# Patient Record
Sex: FEMALE | Race: BLACK OR AFRICAN AMERICAN
[De-identification: names, ages, dates, MRNs, and addresses within clinical notes are randomized per-mention and may not be internally consistent; named-entity substitution may affect disease eponyms.]

---

## 2017-01-04 ENCOUNTER — HOSPITAL ENCOUNTER (EMERGENCY)
Dept: HOSPITAL 62 - ER | Age: 37
LOS: 1 days | Discharge: HOME | End: 2017-01-05
Payer: SELF-PAY

## 2017-01-04 DIAGNOSIS — E78.00: ICD-10-CM

## 2017-01-04 DIAGNOSIS — R51: ICD-10-CM

## 2017-01-04 DIAGNOSIS — J06.9: Primary | ICD-10-CM

## 2017-01-04 DIAGNOSIS — R50.9: ICD-10-CM

## 2017-01-04 DIAGNOSIS — M79.1: ICD-10-CM

## 2017-01-04 PROCEDURE — 99283 EMERGENCY DEPT VISIT LOW MDM: CPT

## 2017-01-04 PROCEDURE — 87804 INFLUENZA ASSAY W/OPTIC: CPT

## 2017-01-04 PROCEDURE — 87070 CULTURE OTHR SPECIMN AEROBIC: CPT

## 2017-01-04 PROCEDURE — 87880 STREP A ASSAY W/OPTIC: CPT

## 2017-01-04 NOTE — ER DOCUMENT REPORT
ED Flu Like





- General


Chief Complaint: Flu Symptoms


Stated Complaint: BODY ACHES/FEVER


Mode of Arrival: Ambulatory


Information source: Patient


Notes: 


Patient is a 36-year-old female who presents to the ER today for 1 day of 

headache, body aches, runny nose and feeling like she had a fever.  Patient did 

not take her temperature.  She denies any sore throat but states she's had a 

mild cough.  She denies any history of asthma or COPD.


TRAVEL OUTSIDE OF THE [place] IN LAST 30 DAYS: No





- Related Data


Allergies/Adverse Reactions: 


 





No Known Allergies Allergy (Verified 03/26/12 13:22)


 











Past Medical History





- General


Information source: Patient





- Social History


Smoking Status: Unknown if Ever Smoked


Family History: CAD, DM, Hyperlipidemia, Hypertension, Malignancy.  denies: 

Arthritis, COPD, CVA, Thyroid Disfunction





- Past Medical History


Cardiac Medical History: Reports: Hx Hypercholesterolemia


Pulmonary Medical History: Reports: Hx Asthma - childhood


GI Medical History: Reports: Hx Colonoscopy


Past Surgical History: Reports: Hx Oral Surgery





- Immunizations


Immunizations up to date: Yes


Hx Diphtheria, Pertussis, Tetanus Vaccination: No - unknown


Hx Pneumococcal Vaccination: 10/01/11





Review of Systems





- Review of Systems


Constitutional: See HPI


EENT: See HPI


Cardiovascular: No symptoms reported


Respiratory: See HPI


Gastrointestinal: No symptoms reported


Genitourinary: No symptoms reported


Female Genitourinary: No symptoms reported


Musculoskeletal: No symptoms reported


Skin: No symptoms reported


Hematologic/Lymphatic: No symptoms reported


Neurological/Psychological: No symptoms reported





Physical Exam





- Vital signs


Vitals: 


 











Temp Pulse Resp BP Pulse Ox


 


 98.5 F   91   20   123/90 H  99 


 


 01/04/17 20:51  01/04/17 20:51  01/04/17 20:51  01/04/17 20:51  01/04/17 20:51














- Notes


Notes: 


PHYSICAL EXAMINATION: 


GENERAL: Mildly ill appearing, and in no acute distress. 


HEAD: Atraumatic, normocephalic. 


EYES: Pupils equal round and reactive to light, extraocular movements intact, 

sclera anicteric, conjunctiva are normal. 


ENT: ear canals without erythema or foreign body, TMs pearly grey with good 

bony landmarks, nares with mucoid discharge, oropharynx clear without exudates. 

Moist mucous membranes. 


NECK: Normal range of motion, supple without lymphadenopathy 


LUNGS: CTAB and equal. No wheezes rales or rhonchi. 


HEART: Regular rate and rhythm without murmurs


EXTREMITIES: Normal range of motion, no pitting edema. No cyanosis. 


NEUROLOGICAL: Cranial nerves grossly intact. Normal sensory/motor exams. 


PSYCH: Normal mood, normal affect. 


SKIN: Warm, Dry, normal turgor, no rashes or lesions noted 

















Course





- Re-evaluation


Re-evalutation: 





01/05/17 01:09


Flu and strep were both negative.





- Vital Signs


Vital signs: 


 











Temp Pulse Resp BP Pulse Ox


 


 98.5 F   91   20   123/90 H  99 


 


 01/04/17 20:51  01/04/17 20:51  01/04/17 20:51  01/04/17 20:51  01/04/17 20:51














Discharge





- Discharge


Clinical Impression: 


URI (upper respiratory infection)


Qualifiers:


 URI type: unspecified URI Qualified Code(s): J06.9 - Acute upper respiratory 

infection, unspecified





Condition: Stable


Disposition: HOME, SELF-CARE


Instructions:  Upper Respiratory Illness ([facility])


Additional Instructions: 


Please drink plenty of fluids and rest.  Return immediately for any new or 

worsening symptoms.





Follow up with primary care provider, call tomorrow to make followup 

appointment.


Prescriptions: 


Guaifenesin/Dextromethorphan [Mucinex Dm Er 1,200-60 Mg Tab] 1 each PO BID PRN #

24 tbmp.12hr


 PRN Reason: 


Ibuprofen [Motrin 800 mg Tablet] 800 mg PO Q8H PRN #30 tab


 PRN Reason: 


Forms:  Return to Work

## 2017-01-05 VITALS — SYSTOLIC BLOOD PRESSURE: 122 MMHG | DIASTOLIC BLOOD PRESSURE: 89 MMHG

## 2017-06-02 ENCOUNTER — HOSPITAL ENCOUNTER (EMERGENCY)
Dept: HOSPITAL 62 - ER | Age: 37
Discharge: HOME | End: 2017-06-02
Payer: SELF-PAY

## 2017-06-02 VITALS — SYSTOLIC BLOOD PRESSURE: 134 MMHG | DIASTOLIC BLOOD PRESSURE: 81 MMHG

## 2017-06-02 DIAGNOSIS — S16.1XXA: Primary | ICD-10-CM

## 2017-06-02 DIAGNOSIS — M54.2: ICD-10-CM
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
## 2017-06-02 PROCEDURE — 99283 EMERGENCY DEPT VISIT LOW MDM: CPT

## 2018-01-11 ENCOUNTER — HOSPITAL ENCOUNTER (EMERGENCY)
Age: 38
Discharge: HOME | End: 2018-01-11
Attending: EMERGENCY MEDICINE
Payer: SELF-PAY

## 2018-01-11 VITALS
HEART RATE: 100 BPM | OXYGEN SATURATION: 100 % | TEMPERATURE: 98.1 F | SYSTOLIC BLOOD PRESSURE: 130 MMHG | RESPIRATION RATE: 16 BRPM | HEIGHT: 65 IN | BODY MASS INDEX: 39.32 KG/M2 | WEIGHT: 236 LBS | DIASTOLIC BLOOD PRESSURE: 88 MMHG

## 2018-01-11 DIAGNOSIS — Z20.828 EXPOSURE TO THE FLU: ICD-10-CM

## 2018-01-11 DIAGNOSIS — J06.9 ACUTE UPPER RESPIRATORY INFECTION: Primary | ICD-10-CM

## 2018-01-11 DIAGNOSIS — R68.89 FLU-LIKE SYMPTOMS: ICD-10-CM

## 2018-01-11 PROCEDURE — 99282 EMERGENCY DEPT VISIT SF MDM: CPT

## 2018-01-11 NOTE — LETTER
[facility] EMERGENCY DEPT 
[facility] [address] [id number] 
[phone] Work/School Note Date: 1/11/2018 To Whom It May concern: 
 
Savanna Milton was seen and treated today in the emergency room by the following provider(s): 
Attending Provider: Abraham Chapman MD. Rambo Marc may return to work on 1/13/18. Sincerely, Abraham Chapman MD

## 2018-01-11 NOTE — DISCHARGE INSTRUCTIONS
Upper Respiratory Infection (Cold): Care Instructions  Your Care Instructions    An upper respiratory infection, or URI, is an infection of the nose, sinuses, or throat. URIs are spread by coughs, sneezes, and direct contact. The common cold is the most frequent kind of URI. The flu and sinus infections are other kinds of URIs. Almost all URIs are caused by viruses. Antibiotics won't cure them. But you can treat most infections with home care. This may include drinking lots of fluids and taking over-the-counter pain medicine. You will probably feel better in 4 to 10 days. The doctor has checked you carefully, but problems can develop later. If you notice any problems or new symptoms, get medical treatment right away. Follow-up care is a key part of your treatment and safety. Be sure to make and go to all appointments, and call your doctor if you are having problems. It's also a good idea to know your test results and keep a list of the medicines you take. How can you care for yourself at home? · To prevent dehydration, drink plenty of fluids, enough so that your urine is light yellow or clear like water. Choose water and other caffeine-free clear liquids until you feel better. If you have kidney, heart, or liver disease and have to limit fluids, talk with your doctor before you increase the amount of fluids you drink. · Take an over-the-counter pain medicine, such as acetaminophen (Tylenol), ibuprofen (Advil, Motrin), or naproxen (Aleve). Read and follow all instructions on the label. · Before you use cough and cold medicines, check the label. These medicines may not be safe for young children or for people with certain health problems. · Be careful when taking over-the-counter cold or flu medicines and Tylenol at the same time. Many of these medicines have acetaminophen, which is Tylenol. Read the labels to make sure that you are not taking more than the recommended dose.  Too much acetaminophen (Tylenol) can be harmful. · Get plenty of rest.  · Do not smoke or allow others to smoke around you. If you need help quitting, talk to your doctor about stop-smoking programs and medicines. These can increase your chances of quitting for good. When should you call for help? Call 911 anytime you think you may need emergency care. For example, call if:  ? · You have severe trouble breathing. ?Call your doctor now or seek immediate medical care if:  ? · You seem to be getting much sicker. ? · You have new or worse trouble breathing. ? · You have a new or higher fever. ? · You have a new rash. ? Watch closely for changes in your health, and be sure to contact your doctor if:  ? · You have a new symptom, such as a sore throat, an earache, or sinus pain. ? · You cough more deeply or more often, especially if you notice more mucus or a change in the color of your mucus. ? · You do not get better as expected. Where can you learn more? Go to http://nano-misty.info/. Enter M980 in the search box to learn more about \"Upper Respiratory Infection (Cold): Care Instructions. \"  Current as of: May 12, 2017  Content Version: 11.4  © 6099-7396 [employer]. Care instructions adapted under license by [employer] (which disclaims liability or warranty for this information). If you have questions about a medical condition or this instruction, always ask your healthcare professional. [place] [address] any warranty or liability for your use of this information.

## 2018-01-11 NOTE — ED PROVIDER NOTES
EMERGENCY DEPARTMENT HISTORY AND PHYSICAL EXAM    3:07 PM      Date: 1/11/2018  Patient Name: Deann Hua    History of Presenting Illness     No chief complaint on file. History Provided By: Patient    Chief Complaint: Generalized body aches  Duration:  3 days  Timing:  Worsening  Location: Patient also c/o Headache  Quality: Soreness  Severity: 6 out of 10  Modifying Factors: Patient states she has been taking Theraflu and Benadryl with no relief in her symptoms. States that it only makes her feel drowsy. Associated Symptoms: Associated symptoms include weakness, a fever 3 days ago, with the highest being 99.3, nasal congestion, HA, rhinorrhea, and intermittent diarrhea that started this morning. Additional History (Context): April Anton Carmita is a 40 y.o. female with No significant past medical history who presents with generalized myalgia onset 3 days. Patient states that she went to work 3 days ago, was off 2 days ago, went to work 1 day ago, and states that her symptoms worsened today. Patient states that she was exposed to the flu from living with her friend's daughter who had a positive flu over the past weekend. Patient states she has been taking Theraflu and Benadryl with no relief in her symptoms. States that it only makes her feel drowsy. Associated symptoms include weakness, a fever 3 days ago, with the highest being 99.3, nasal congestion, HA, rhinorrhea, and intermittent diarrhea that started this morning. LNMP was 12/13/2017. Patient expresses no other concerns at this time. PCP: None        Past History     Past Medical History:  History reviewed. No pertinent past medical history. Past Surgical History:  History reviewed. No pertinent surgical history. Family History:  History reviewed. No pertinent family history.     Social History:  Social History   Substance Use Topics    Smoking status: Never Smoker    Smokeless tobacco: Never Used    Alcohol use None       Allergies:  No Known Allergies      Review of Systems     Review of Systems   Constitutional: Positive for fever (fever of 99.3 3 days ago, but has improved). Negative for chills. HENT: Positive for congestion (nasal) and rhinorrhea. Negative for sore throat. Respiratory: Negative for cough and shortness of breath. Cardiovascular: Negative for chest pain. Gastrointestinal: Positive for diarrhea. Negative for abdominal pain, nausea and vomiting. Genitourinary: Negative for dysuria. Musculoskeletal: Positive for myalgias (generalized). Negative for back pain. Skin: Negative for rash. Neurological: Positive for weakness and headaches. Negative for dizziness. All other systems reviewed and are negative. Physical Exam     Visit Vitals    /88 (BP 1 Location: Right arm, BP Patient Position: At rest)    Pulse 100    Temp 98.1 °F (36.7 °C)    Resp 16    Ht 5' 5\" (1.651 m)    Wt 107 kg (236 lb)    LMP 12/13/2017    SpO2 100%    BMI 39.27 kg/m2     Physical Exam  General Exam: Patient is a well developed and well nourished in no distress. Patient does not appear acutely ill or toxic. Eye Exam: Lids and conjunctiva are normal  ENT Exam: The general head and facial exam is normal.  The neck is supple without meningeal signs. No significant adenopathy. Pulmonary Exam: No respiratory distress. The respiratory rate is normal.  No stridor. The breath sounds are equal bilaterally. There are no wheezes, rales, or rhonchi noted. Cardiac Exam: The cardiac rate and rhythm are normal.  No significant murmurs, rubs, or gallops. The peripheral pulses are normal.  Abdominal Exam: Abdomen is soft and non-distended. No pulsatile masses. There is no local tenderness. There is no rebound or guarding noted. Skin and Soft Tissue: The skin is warm and dry, without significant abnormality. Good color. Musculoskeletal Exam: No peripheral edema.   The musculoskeletal exam of the lower extremities is normal without significant local tenderness. Psychiatric: Normal adult with appropriate demeanor and interpersonal interaction. Is oriented to person, place, and time. Diagnostic Study Results     Labs -  No results found for this or any previous visit (from the past 12 hour(s)). Radiologic Studies -   No orders to display         Medical Decision Making   I am the first provider for this patient. I reviewed the vital signs, available nursing notes, past medical history, past surgical history, family history and social history. Vital Signs-Reviewed the patient's vital signs. Records Reviewed: Nursing Notes and Triage notes (Time of Review: 3:07 PM)    ED Course: Progress Notes, Reevaluation, and Consults:    Provider Notes (Medical Decision Making): Flu like illness with known exposure. Out of window for Tamiflu and pt is not immunocompromised. Discussed diagnosis, plan for supportive care, and need to return with any worsening symptoms. Vitals reassuring, no indication for labs or imaging at this time. Diagnosis     Clinical Impression:   1. Acute upper respiratory infection    2. Flu-like symptoms    3. Exposure to the flu        Disposition: Discharged     Follow-up Information     Follow up With Details Comments Contact Info    [facility] EMERGENCY DEPT   [address] [place] [address]  [phone]           Patient's Medications    No medications on file     _______________________________    Attestations:  [id number] [id number] acting as a scribe for and in the presence of Barber Lozano MD      January 11, 2018 at 3:10 PM       Provider Attestation:      I personally performed the services described in the documentation, reviewed the documentation, as recorded by the scribe in my presence, and it accurately and completely records my words and actions.  January 11, 2018 at 3:10 PM - Barber Lozano MD    _______________________________

## 2020-03-15 ENCOUNTER — HOSPITAL ENCOUNTER (EMERGENCY)
Dept: HOSPITAL 62 - ER | Age: 40
Discharge: HOME | End: 2020-03-15
Payer: SELF-PAY

## 2020-03-15 VITALS — SYSTOLIC BLOOD PRESSURE: 135 MMHG | DIASTOLIC BLOOD PRESSURE: 88 MMHG

## 2020-03-15 DIAGNOSIS — E78.00: ICD-10-CM

## 2020-03-15 DIAGNOSIS — R09.81: ICD-10-CM

## 2020-03-15 DIAGNOSIS — J30.9: Primary | ICD-10-CM

## 2020-03-15 PROCEDURE — 99283 EMERGENCY DEPT VISIT LOW MDM: CPT

## 2020-03-15 PROCEDURE — 96372 THER/PROPH/DIAG INJ SC/IM: CPT

## 2020-03-15 NOTE — ER DOCUMENT REPORT
HPI





- HPI


Time Seen by Provider: 03/15/20 10:09


Onset: Other - This is a 39-year-old female presented emergency room today 

stating that she has had nasal discharge for approximately 2 days she works in a

[employer] center and she is concerned about getting her but he also affected her 

employer wanted a work note before she could return.


Quality of pain: No pain


Pain Level: 0


Associated Symptoms: None


Exacerbated by: Denies





- CONSTITUTIONAL


Constitutional: DENIES: Fever, Chills





- REPRODUCTIVE


Reproductive: DENIES: Pregnant:





Past Medical History





- General


Information source: Patient





- Social History


Smoking Status: Never Smoker


Chew tobacco use (# tins/day): No


Frequency of alcohol use: None


Drug Abuse: None


Family History: DM, Hyperlipidemia, Hypertension, Malignancy.  denies: 

Arthritis, CAD, COPD, CVA, Thyroid Disfunction


Patient has suicidal ideation: No


Patient has homicidal ideation: No





- Past Medical History


Cardiac Medical History: Reports: Hx Hypercholesterolemia


Pulmonary Medical History: Reports: Hx Asthma - childhood


Renal/ Medical History: Denies: Hx Peritoneal Dialysis


GI Medical History: Reports: Hx Colonoscopy


Past Surgical History: Reports: Hx Oral Surgery





- Immunizations


Immunizations up to date: Yes


Hx Diphtheria, Pertussis, Tetanus Vaccination: No - unknown


Hx Pneumococcal Vaccination: 10/01/11





Vertical Provider Document





- CONSTITUTIONAL


Agree With Documented VS: Yes





- INFECTION CONTROL


TRAVEL OUTSIDE OF THE [place] IN LAST 30 DAYS: No





- HEENT


HEENT: Atraumatic





- NECK


Neck: Normal Inspection





- RESPIRATORY


Respiratory: Breath Sounds Normal





- CARDIOVASCULAR


Cardiovascular: Regular Rate, Regular Rhythm


Pulses: Normal: Brachial, Radial, Carotid, Femoral, Popliteal





- GI/ABDOMEN


Gastrointestinal: Abdomen Soft, Abdomen Non-Tender





- REPRODUCTIVE


Female Genitalia: Normal Inspection





- BACK


Back: Normal Inspection





Course





- Vital Signs


Vital signs: 


                                        











Temp Pulse Resp BP Pulse Ox


 


 97.5 F   97   16   135/88 H  97 


 


 03/15/20 10:14  03/15/20 10:14  03/15/20 10:14  03/15/20 10:14  03/15/20 10:14














Discharge





- Discharge


Clinical Impression: 


Allergic rhinitis


Qualifiers:


 Allergic rhinitis trigger: unspecified Allergic rhinitis seasonality: 

unspecified Qualified Code(s): J30.9 - Allergic rhinitis, unspecified





Disposition: HOME, SELF-CARE


Instructions:  Non-Sedating Prescription Antihistamine ([facility])


Prescriptions: 


Loratadine 10 mg PO QAM #30 tab.[email or web address]

## 2020-12-30 ENCOUNTER — HOSPITAL ENCOUNTER (EMERGENCY)
Dept: HOSPITAL 62 - ER | Age: 40
Discharge: HOME | End: 2020-12-30
Payer: SELF-PAY

## 2020-12-30 VITALS — SYSTOLIC BLOOD PRESSURE: 136 MMHG | DIASTOLIC BLOOD PRESSURE: 88 MMHG

## 2020-12-30 DIAGNOSIS — M54.5: Primary | ICD-10-CM

## 2020-12-30 DIAGNOSIS — R30.0: ICD-10-CM

## 2020-12-30 LAB
APPEARANCE UR: (no result)
APTT PPP: (no result) S
BILIRUB UR QL STRIP: NEGATIVE
GLUCOSE UR STRIP-MCNC: NEGATIVE MG/DL
KETONES UR STRIP-MCNC: NEGATIVE MG/DL
NITRITE UR QL STRIP: NEGATIVE
PH UR STRIP: 5 [PH] (ref 5–9)
PROT UR STRIP-MCNC: 100 MG/DL
SP GR UR STRIP: 1.04
UROBILINOGEN UR-MCNC: 2 MG/DL (ref ?–2)

## 2020-12-30 PROCEDURE — 81025 URINE PREGNANCY TEST: CPT

## 2020-12-30 PROCEDURE — 99283 EMERGENCY DEPT VISIT LOW MDM: CPT

## 2020-12-30 PROCEDURE — 81001 URINALYSIS AUTO W/SCOPE: CPT

## 2020-12-30 NOTE — ER DOCUMENT REPORT
HPI





- HPI


Patient complains to provider of: Back pain


Time Seen by Provider: 12/30/20 14:25


Onset/Duration: Gradual


Quality of pain: Achy


Context: 





Patient presents complaining of lower back pain for the past 2 days.  Patient 

states dysuria started yesterday.  Patient denies any traumatic injury.  Patient

denies any fever, nausea or vomiting.


Associated Symptoms: denies: Fever, Nausea, Vomiting


Exacerbated by: Movement


Relieved by: Denies


Similar symptoms previously: No


Recently seen / treated by doctor: No





- ROS


ROS below otherwise negative: Yes


Systems Reviewed and Negative: Yes All other systems reviewed and negative





- CONSTITUTIONAL


Constitutional: DENIES: Fever, Chills





- NEURO


Neurology: DENIES: Headache, Weakness





- GASTROINTESTINAL


Gastrointestinal: DENIES: Abdominal Pain, Nausea, Patient vomiting





- URINARY


Urinary: REPORTS: Dysuria.  DENIES: Urgency





- REPRODUCTIVE


Reproductive: DENIES: Pregnant:





- MUSCULOSKELETAL


Musculoskeletal: REPORTS: Back Pain.  DENIES: Extremity pain





- DERM


Skin Color: Normal


Skin Problems: None





Past Medical History





- General


Information source: Patient





- Social History


Smoking Status: Never Smoker


Frequency of alcohol use: Occasional


Drug Abuse: None


Occupation: Works from home


Family History: DM, Hyperlipidemia, Hypertension, Malignancy.  denies: 

Arthritis, CAD, COPD, CVA, Thyroid Disfunction





- Past Medical History


Cardiac Medical History: Reports: Hx Hypercholesterolemia


Pulmonary Medical History: Reports: Hx Asthma - childhood


Renal/ Medical History: Denies: Hx Peritoneal Dialysis


GI Medical History: Reports: Hx Colonoscopy


Past Surgical History: Reports: Hx Oral Surgery





- Immunizations


Immunizations up to date: Yes


Hx Diphtheria, Pertussis, Tetanus Vaccination: No - unknown


Hx Pneumococcal Vaccination: 10/01/11





Vertical Provider Document





- CONSTITUTIONAL


Agree With Documented VS: Yes


Exam Limitations: No Limitations


General Appearance: WD/WN, No Apparent Distress


Notes: 





PHYSICAL EXAMINATION:





GENERAL: Well-appearing, well-nourished and in no acute distress.





HEAD: Atraumatic, normocephalic.





EYES: sclera clear, anicteric, conjunctiva are normal.





ENT: nares patent, Moist mucous membranes.


no lymphadenopathy





LUNGS: respirations unlabored





HEART: Regular rate and rhythm without murmurs 





EXTREMITIES: Normal range of motion, no pitting or edema.  No cyanosis. Gait 

normal, pt ambulates without difficulty





BACK: No midline tenderness, no deformities or step-offs.  Bilateral CVA 

tenderness. 





NEUROLOGICAL: Cranial nerves grossly intact.  Normal speech, normal gait.   





PSYCH: Normal mood, normal affect.





SKIN: Warm, Dry, normal turgor, no rashes or lesions noted.











- INFECTION CONTROL


TRAVEL OUTSIDE OF THE [place] IN LAST 30 DAYS: No





Course





- Re-evaluation


Re-evalutation: 





12/30/20 16:01


Spoke with technician in lab who states that analyzer is down and that they are 

having to run urinalysis manually.


12/30/20 17:01


Patient still without any urinalysis results at this time.  Will treat patient 

symptomatically as patient is nontoxic in appearance, no concern for sepsis at 

this time.  No concern for any obstructive uropathy as patient's pain symptoms 

are bilateral.  Patient is agreeable with this discharge plan of care at this 

time.





- Laboratory Results


Critical Laboratory Results Reviewed: No Critical Results





- Radiology Results


Critical Radiology Results Reviewed: No Critical Results





Discharge





- Discharge


Clinical Impression: 


 Urinary symptom or sign





Low back pain


Qualifiers:


 Chronicity: unspecified Back pain laterality: bilateral Sciatica presence: 

without sciatica Qualified Code(s): M54.5 - Low back pain





Condition: Stable


Disposition: HOME, SELF-CARE


Instructions:  Cephalexin ([facility]), Low Back Pain ([facility]), Muscle Relaxers ([facility]), 

Warm Packs ([facility])


Additional Instructions: 


Return immediately for any new or worsening symptoms





Followup with your primary care provider, call tomorrow to make a followup 

appointment








Prescriptions: 


Cephalexin Monohydrate [Keflex 500 mg Capsule] 500 mg PO Q6H 5 Days #20 capsule


Lidocaine [Lidoderm 5% (700 mg) Transdermal Patch] 1 patch TP DAILY PRN #10 

adh..patch


 PRN Reason: 


Naproxen [Naprosyn 250 Nmg Tablet] 1 tab PO BID #14 tablet


Methocarbamol [Robaxin 500 Mg Tablet] 500 mg PO QID PRN #30 tablet


 PRN Reason: 


Forms:  Return to Work


[facility] 


[facility] - Follow up as needed

## 2021-01-06 ENCOUNTER — HOSPITAL ENCOUNTER (EMERGENCY)
Dept: HOSPITAL 62 - ER | Age: 41
Discharge: LEFT BEFORE DISCHARGE | End: 2021-01-06
Payer: SELF-PAY

## 2021-01-06 VITALS — DIASTOLIC BLOOD PRESSURE: 78 MMHG | SYSTOLIC BLOOD PRESSURE: 129 MMHG

## 2021-01-06 DIAGNOSIS — Z53.21: Primary | ICD-10-CM

## 2021-01-09 ENCOUNTER — HOSPITAL ENCOUNTER (EMERGENCY)
Dept: HOSPITAL 62 - ER | Age: 41
Discharge: HOME | End: 2021-01-09
Payer: SELF-PAY

## 2021-01-09 VITALS — SYSTOLIC BLOOD PRESSURE: 130 MMHG | DIASTOLIC BLOOD PRESSURE: 88 MMHG

## 2021-01-09 DIAGNOSIS — B96.89: ICD-10-CM

## 2021-01-09 DIAGNOSIS — N39.0: ICD-10-CM

## 2021-01-09 DIAGNOSIS — N76.0: Primary | ICD-10-CM

## 2021-01-09 DIAGNOSIS — L29.2: ICD-10-CM

## 2021-01-09 DIAGNOSIS — B37.3: ICD-10-CM

## 2021-01-09 LAB
APPEARANCE UR: (no result)
APTT PPP: YELLOW S
BILIRUB UR QL STRIP: NEGATIVE
CHLAM PCR: NOT DETECTED
GLUCOSE UR STRIP-MCNC: NEGATIVE MG/DL
KETONES UR STRIP-MCNC: NEGATIVE MG/DL
NITRITE UR QL STRIP: NEGATIVE
PH UR STRIP: 5 [PH] (ref 5–9)
PROT UR STRIP-MCNC: NEGATIVE MG/DL
RBCS (WET MOUNT): (no result)
SP GR UR STRIP: 1.02
T.VAGINALIS (WET MOUNT): (no result)
UROBILINOGEN UR-MCNC: NEGATIVE MG/DL (ref ?–2)
WBCS (WET MOUNT): (no result)
YEAST (WET MOUNT): (no result)

## 2021-01-09 PROCEDURE — 87491 CHLMYD TRACH DNA AMP PROBE: CPT

## 2021-01-09 PROCEDURE — 87086 URINE CULTURE/COLONY COUNT: CPT

## 2021-01-09 PROCEDURE — 81025 URINE PREGNANCY TEST: CPT

## 2021-01-09 PROCEDURE — 87591 N.GONORRHOEAE DNA AMP PROB: CPT

## 2021-01-09 PROCEDURE — 87210 SMEAR WET MOUNT SALINE/INK: CPT

## 2021-01-09 PROCEDURE — 99283 EMERGENCY DEPT VISIT LOW MDM: CPT

## 2021-01-09 PROCEDURE — 81001 URINALYSIS AUTO W/SCOPE: CPT

## 2021-01-09 NOTE — ER DOCUMENT REPORT
ED General





- General


Chief Complaint: Vaginal Discharge


Stated Complaint: YEAST INFECTION


Time Seen by Provider: 01/09/21 08:09


Primary Care Provider: 


RAMON JOHNS MD [ACTIVE STAFF] - Follow up as needed


SAHRA BECKFORD MD [NO LOCAL MD] - Follow up as needed


TRAVEL OUTSIDE OF THE [place] IN LAST 30 DAYS: No





- HPI


Notes: 





40-year-old female presents to the emergency room today for evaluation of 

vaginal itching and white discharge status post being placed on a antibiotic for

urinary tract infection on 12/30/2020.  Patient was placed on Keflex.  Patient 

states that she finished a course of antibiotics.  Reports the first day of her 

last menstrual cycle was 1/8/2021.  Reports some dysuria, denies low back pain. 

patient states this typically does get a vaginal yeast infection after taking 

antibiotics but she forgot to ask for Diflucan when she was last seen.  She has 

not gone to an urgent care her primary careprovider due to lack of insurance.  

Denies fevers, chills,  chest pain,palpitations,  shortness of breath, dyspnea, 

nausea, vomiting, diarrhea, abdominal pain, speech changes, LH, dizziness, 

syncope, headaches, wheezing, ST, URI, neck pain, weakness, bowel or bladder 

dysfunction, saddle anesthesia, numbness or tingling in bilateral upper or lower

extremities equally, muscle paralysis, weakness in bilateral upper or lower 

extremities equally or rash. 





- Related Data


Allergies/Adverse Reactions: 


                                        





No Known Allergies Allergy (Verified 03/15/20 10:11)


   











Past Medical History





- General


Information source: Patient





- Social History


Smoking Status: Never Smoker


Chew tobacco use (# tins/day): No


Frequency of alcohol use: None


Drug Abuse: None


Family History: DM, Hyperlipidemia, Hypertension, Malignancy.  denies: 

Arthritis, CAD, COPD, CVA, Thyroid Disfunction





- Past Medical History


Cardiac Medical History: Reports: Hx Hypercholesterolemia


Pulmonary Medical History: Reports: Hx Asthma - childhood


Renal/ Medical History: Denies: Hx Peritoneal Dialysis


GI Medical History: Reports: Hx Colonoscopy


Past Surgical History: Reports: Hx Oral Surgery





- Immunizations


Immunizations up to date: Yes


Hx Diphtheria, Pertussis, Tetanus Vaccination: No - unknown


Hx Pneumococcal Vaccination: 10/01/11





Review of Systems





- Review of Systems


Constitutional: No symptoms reported


EENT: No symptoms reported


Cardiovascular: No symptoms reported


Respiratory: No symptoms reported


Gastrointestinal: No symptoms reported


Genitourinary: No symptoms reported


Female Genitourinary: See HPI


Musculoskeletal: No symptoms reported


Skin: No symptoms reported


Hematologic/Lymphatic: No symptoms reported


Neurological/Psychological: No symptoms reported





Physical Exam





- Vital signs


Vitals: 


                                        











Temp Pulse Resp BP Pulse Ox


 


 97.5 F   85   16   113/90 H  99 


 


 01/09/21 06:59  01/09/21 06:59  01/09/21 06:59  01/09/21 06:59  01/09/21 06:59














- Notes


Notes: 





MEDICATIONS: I agree with the patient medications as charted by the RN.





ALLERGIES: I agree with the allergies as charted by the RN.





PAST MEDICAL HISTORY/PAST SURGICAL HISTORY: Reviewed and agree as charted by RN.





SOCIAL HISTORY: Reviewed and agree as charted by RN.





FAMILY HISTORY: No significant familial comorbid conditions directly related to 

patient complaint





EXAM:


Reviewed vital signs as charted by RN.





PHYSICAL EXAMINATION: reviewed vital signs by RN





GENERAL: Well-appearing, well-nourished and in no acute distress.





HEAD: Atraumatic, normocephalic.





EYES: Pupils equal round and reactive to light, extraocular movements intact, 

conjunctiva are normal.





ENT: Nares patent, oropharynx clear without exudates.  Moist mucous membranes.





NECK: Normal range of motion, supple without lymphadenopathy





LUNGS: Breath sounds clear to auscultation bilaterally and equal.  No wheezes 

rales or rhonchi.





HEART: Regular rate and rhythm without murmurs





ABDOMEN: Soft, nontender, nondistended abdomen.  No guarding, no rebound.  No 

masses appreciated.





Female : Patient consented to a pelvic exam external genitalia without 

erythema, exudate or discharge. Vaginal vault is without discharge. Cervix is of

normal color without lesion. Uterus is noted to be of normal size and nontender.

No cervical motion tenderness is seen. No masses are palpated. No blood in the 

vaginal vault without clots, os closed, no adnexal tenderness or mass.   Ethel Capellan RN at bedside as chaperone





Musculoskeletal: Normal range of motion, no pitting or edema.  No cyanosis.





NEUROLOGICAL: Cranial nerves grossly intact.  Normal speech, normal gait.  

Normal sensory, motor exams





PSYCH: Normal mood, normal affect.





SKIN: Warm, Dry, normal turgor, no rashes or lesions noted.








Course





- Re-evaluation


Re-evalutation: 





01/09/21 09:16


Afebrile, vital stable, no distress.,  Nurses notes reviewed.  Urinalysis showed

large leuk esterase and hematuria, no proteinuria or ketones or nitrates. urine 

culture pending.  Wet mount positive for bacterial vaginosis and yeast.  No 

trichomoniasis.  G/C negative.  Will treat for UTI with outpatient antibiotic 

therapy as well as for bacterial vaginosis with Flagyl which is the standard of 

care.  I also did prescribe patient concurrently with Diflucan.  Advised to take

a probiotic 2 hours either before or after taking antibiotics.  Discussed proper

hygiene to prevent UTIs as well as urinating after sexual intercourse, etc. 

After performing a Medical Screening Examination, I estimate there is LOW risk 

for ACUTE APPENDICITIS, BOWEL OBSTRUCTION, ACUTE CHOLECYSTITIS, PERFORATED 

DIVERTICULITIS, INCARCERATED HERNIA, PANCREATITIS, PELVIC INFLAMMATORY DISEASE, 

PERFORATED ULCER, ECTOPIC PREGNANCY, or TUBO-OVARIAN ABSCESS, thus I consider 

the discharge disposition reasonable. Also, there is no evidence or peritonitis,

sepsis, or toxicity. I have reevaluated this patient multiple times and no 

significant life threatening changes are noted. The patient and I have discussed

the diagnosis and risks, and we agree with discharging home with close follow-up

with the understanding that symptoms and presentations can change. We also 

discussed returning to the Emergency Department immediately if new or worsening 

symptoms occur. We have discussed the symptoms which are most concerning (e.g., 

bloody stool, fever, changing or worsening pain, vomiting) that necessitate 

immediate return.


01/09/21 09:24








- Vital Signs


Vital signs: 


                                        











Temp Pulse Resp BP Pulse Ox


 


 98.0 F   80   18   130/88 H  98 


 


 01/09/21 10:24  01/09/21 10:24  01/09/21 10:24  01/09/21 10:24  01/09/21 10:24














- Laboratory Results


Laboratory Results Interpreted: 


                                        











  01/09/21





  08:25


 


Urine Blood  MODERATE H


 


Ur Leukocyte Esterase  LARGE H











Critical Laboratory Results Reviewed: No Critical Results





- Radiology Results


Critical Radiology Results Reviewed: No Critical Results





Discharge





- Discharge


Clinical Impression: 


 Candida vaginitis, UTI (urinary tract infection), Bacterial vaginosis





Condition: Stable


Disposition: HOME, SELF-CARE


Instructions:  Nitrofurantoin ([facility]), Urinary Tract Infection ([facility]), Vaginal 

Yeast Infection ([facility])


Additional Instructions: 


VAGINITIS:





     Your exam shows that you have vaginitis, a vaginal infection.  The 

infection can be caused by a many different organisms, including trichomonas or 

Gardnerella.  The usual symptoms are vaginal irritation and discharge.


     The treatment is usually antibiotics such as Flagyl.  Laboratory tests can 

determine which germ is responsible.


     Use the medication as prescribed.  Because this infection can be 

transmitted sexually, your sexual partner may need to be checked and treated 

also.  If your physician has not discussed this with you, please check before 

resuming sexual relations.  If a culture shows gonorrhea or chlamydia, the 

infection must be reported to the health department.


     Call the doctor if you develop pelvic pain, fever, or problems with 

urination, or if you don't improve as expected.








VAGINOSIS, BACTERIAL:





     Your exam shows you have bacterial vaginosis. This condition is due to an 

overgrowth of bacteria in the vagina. Symptoms may include vaginal itching or 

pain, a smelly discharge, and sometimes burning with urination. Normally this is

not transmitted by sexual contact.


     Vaginosis can be treated with oral or topical antibiotics. Metronidazole 

(Flagyl) pills are usually effective. Topical vaginal creams include Cleocin and

Metro-Gel. You should avoid sexual contact until your symptoms are all better.


     Call the doctor if you develop pelvic pain, fever, or problems with 

urination, or if you don't improve as expected.








VAGINAL YEAST INFECTION:





     You have evidence of a yeast infection -- called "candida."  A vaginal 

yeast infection often causes itching and discharge.  While not dangerous, it can

be very unpleasant.  A yeast infection often follows the use of powerful 

antibiotics.  It is more likely to occur in diabetics.


     The treatment now is usually a single pill of Diflucan, but also an 

antifungal cream or suppository may be used for a few days.  You do not need to 

avoid sexual intercourse.


     Recurrences are common.  You can make a recurrence less likely by wearing 

cotton underwear and avoiding tight clothing.  For mild recurrences, you can try

over-the-counter creams or suppositories that are made specifically for yeast.


     If the symptoms do not resolve, you should follow up for re-examination.  

Sometimes treatment of the sexual partner is necessary if infections are 

recurrent.








METRONIDAZOLE:


     Metronidazole (Flagyl) has been prescribed.  This medication is used to 

kill a type of bacteria called anaerobes, and protozoan parasites such as 

trichomonas and Giardia.


     Flagyl often causes a metallic taste in the mouth and mild nausea.


     Do not use alcohol in any form with Flagyl (including alcohol in medication

elixirs).  Flagyl interacts with alcohol to cause flushing, palpitations, 

headache, stomach cramps, and vomiting.  Do not use Flagyl if you are taking 

Antabuse (disulfiram).


     Call the doctor at once if you develop rash, shortness of breath, itching, 

or lightheadedness.








FLUCONAZOLE:


     Fluconazole (Diflucan) is an antifungal drug.  It is useful for serious 

fungal infections, but is also excellent for oral or vaginal yeast infections.


     Diflucan interacts with some medicines.  This is a concern if you are 

taking anticoagulants (such as Coumadin), phenytoin (Dilantin), cyclosporin, or 

oral hypoglycemics (such as tolbutamide, Orinase, glipizide, Glucotrol, 

glyburide, DiaBeta, Glynase, and Micronase).  Be sure the doctor knows if you 

are taking one of these medicines.


     We don't know how Diflucan affects pregnancy.  If you are planning to 

become pregnant, discuss this with your doctor.


     Diflucan has few side effects.  Minor side effects may include nausea, 

headache, or diarrhea.  Call the doctor if you develop a skin rash, shortness of

breath, or other new symptoms.








MICONAZOLE:


     Several brands of miconazole are available without prescription. These 

medicines are safe and effective for candida (yeast) vaginal infections.  You 

can select suppositories or cream.  Brands include Monistat, Gyne-Lotrimin, and 

Mycelex.


     Don't use miconazole if you're pregnant, unless you discuss it with your 

doctor.


     If you develop rash, irritation, fever, increased discharge, or abdominal 

pain, stop using the medicine and see your doctor.








FOLLOW-UP CARE:


If you have been referred to a physician for follow-up care, call the 

physicians office for an appointment as you were instructed or within the next 

two days.  If you experience worsening or a significant change in your symptoms,

notify the physician immediately or return to the Emergency Department at any 

time for re-evaluation.URINARY TRACT INFECTION:





     Your evaluation indicates that you have a urinary tract infection. This is 

due to germs growing in the bladder.  This is a common problem.


     This infection usually responds quickly to antibiotics.  Your antibiotic 

should be taken exactly as prescribed.  Drink plenty of fluids -- three to four 

quarts a day.


     Occasionally, a bladder anesthetic will be prescribed to help stop the 

feeling of urgency until the antibiotic has a chance to clear the infection.  

This may cause your urine to be dark orange.


     Certain urine infections require a culture.  If the doctor obtained a 

culture, the results will be back in two days.  You should call to see if a 

change in treatment is needed.


     A repeat urinalysis after you finish treatment is often recommended.  The 

physician will let you know if further testing is required.


     Call the doctor if you develop fever, chills, flank pain, inability to 

urinate, or blood in the urine.








ANTIBIOTIC THERAPY:


     You have been given an antibiotic prescription.  It's important that you 

take all the medication, unless instructed otherwise by your physician.  Failure

to complete the entire course can result in relapse of your condition.


     Common side effects of antibiotics include nausea, intestinal cramping, or 

diarrhea.  Women may develop vaginal yeast infections, and babies can get yeast 

(thrush) in the mouth following the use of antibiotics.  Contact your physician 

if you develop significant side effects from this medication.


     Allergy to this antibiotic can result in hives, wheezing, faintness, or 

itching.  If symptoms of allergy occur, stop the medication and call the doctor.








NITROFURANTOIN (MACRODANTIN, MACROBID):


     You have received a prescription for nitrofurantoin (Macrodantin). This 

antibiotic is used for urinary tract infections.





Women who are pregnant or nursing should notify the physician before taking this

medicine.  If you have ever had a problem caused by this medication in the past,

be sure the physician is aware of it.


     Common side effects of this medicine include nausea, vomiting, or decreased

appetite.  Notify your physician if these side effects become severe.


     Immediately stop this medicine and call the physician if you develop cough,

shortness of breath, chest pain, weakness, jaundice (yellow color of the skin 

and whites of the eyes), or a skin rash.











FOLLOW-UP CARE:


If you have been referred to a physician for follow-up care, call the 

physicians office for an appointment as you were instructed or within the next 

two days.  If you experience worsening or a significant change in your symptoms,

notify the physician immediately or return to the Emergency Department at any 

time for re-evaluation.





------------------------








VAGINAL YEAST INFECTION:





     You have evidence of a yeast infection -- called "candida."  A vaginal 

yeast infection often causes itching and discharge.  While not dangerous, it can

be very unpleasant.  A yeast infection often follows the use of powerful anti

biotics.  It is more likely to occur in diabetics.


     The treatment now is usually a single pill of Diflucan, but also an 

antifungal cream or suppository may be used for a few days.  You do not need to 

avoid sexual intercourse.


     Recurrences are common.  You can make a recurrence less likely by wearing 

cotton underwear and avoiding tight clothing.  For mild recurrences, you can try

over-the-counter creams or suppositories that are made specifically for yeast.


     If the symptoms do not resolve, you should follow up for re-examination.  

Sometimes treatment of the sexual partner is necessary if infections are 

recurrent.








FLUCONAZOLE:


     Fluconazole (Diflucan) is an antifungal drug.  It is useful for serious 

fungal infections, but is also excellent for oral or vaginal yeast infections.


     Diflucan interacts with some medicines.  This is a concern if you are 

taking anticoagulants (such as Coumadin), phenytoin (Dilantin), cyclosporin, or 

oral hypoglycemics (such as tolbutamide, Orinase, glipizide, Glucotrol, 

glyburide, DiaBeta, Glynase, and Micronase).  Be sure the doctor knows if you 

are taking one of these medicines.


     We don't know how Diflucan affects pregnancy.  If you are planning to 

become pregnant, discuss this with your doctor.


     Diflucan has few side effects.  Minor side effects may include nausea, 

headache, or diarrhea.  Call the doctor if you develop a skin rash, shortness of

breath, or other new symptoms.





FOLLOW-UP CARE:


If you have been referred to a physician for follow-up care, call the 

physicians office for an appointment as you were instructed or within the next 

two days.  If you experience worsening or a significant change in your symptoms,

notify the physician immediately or return to the Emergency Department at any 

time for re-evaluation.





Return immediately for any new or worsening symptoms.





Follow up with primary care provider, call tomorrow to make followup 

appointment.


Prescriptions: 


Fluconazole [Diflucan] 150 mg PO ONCE PRN #3 tablet


 PRN Reason: 


Metronidazole [Flagyl] 500 mg PO BID #14 tablet


Nitrofurantoin Monohyd/M-Cryst [Macrobid 100 mg Capsule] 100 mg PO BID #20 cap


Referrals: 


RAMON JOHNS MD [ACTIVE STAFF] - Follow up as needed


SAHRA BECKFORD MD [NO LOCAL MD] - Follow up as needed